# Patient Record
Sex: FEMALE | Race: WHITE | Employment: UNEMPLOYED | URBAN - NONMETROPOLITAN AREA
[De-identification: names, ages, dates, MRNs, and addresses within clinical notes are randomized per-mention and may not be internally consistent; named-entity substitution may affect disease eponyms.]

---

## 2017-04-04 ENCOUNTER — OFFICE VISIT (OUTPATIENT)
Dept: BARIATRICS/WEIGHT MGMT | Age: 47
End: 2017-04-04

## 2017-04-04 VITALS
WEIGHT: 261.6 LBS | HEART RATE: 104 BPM | BODY MASS INDEX: 49.39 KG/M2 | SYSTOLIC BLOOD PRESSURE: 136 MMHG | HEIGHT: 61 IN | DIASTOLIC BLOOD PRESSURE: 84 MMHG | RESPIRATION RATE: 18 BRPM | TEMPERATURE: 98.8 F

## 2017-04-04 DIAGNOSIS — K27.9 PEPTIC ULCER DISEASE: ICD-10-CM

## 2017-04-04 DIAGNOSIS — Z87.442 HISTORY OF KIDNEY STONES: ICD-10-CM

## 2017-04-04 DIAGNOSIS — E66.01 MORBID OBESITY WITH BMI OF 45.0-49.9, ADULT (HCC): Primary | ICD-10-CM

## 2017-04-04 DIAGNOSIS — E11.9 TYPE 2 DIABETES MELLITUS TREATED WITHOUT INSULIN (HCC): ICD-10-CM

## 2017-04-04 DIAGNOSIS — E03.9 HYPOTHYROIDISM, UNSPECIFIED: ICD-10-CM

## 2017-04-04 DIAGNOSIS — M54.41 LOW BACK PAIN WITH RIGHT-SIDED SCIATICA, UNSPECIFIED BACK PAIN LATERALITY, UNSPECIFIED CHRONICITY: ICD-10-CM

## 2017-04-04 PROCEDURE — 99204 OFFICE O/P NEW MOD 45 MIN: CPT | Performed by: SURGERY

## 2017-04-04 ASSESSMENT — ENCOUNTER SYMPTOMS
BACK PAIN: 1
ALLERGIC/IMMUNOLOGIC NEGATIVE: 1
RESPIRATORY NEGATIVE: 1
NAUSEA: 1
EYES NEGATIVE: 1

## 2017-04-21 ENCOUNTER — OFFICE VISIT (OUTPATIENT)
Dept: BARIATRICS/WEIGHT MGMT | Age: 47
End: 2017-04-21

## 2017-04-21 DIAGNOSIS — E66.9 OBESITY, UNSPECIFIED OBESITY SEVERITY, UNSPECIFIED OBESITY TYPE: Primary | ICD-10-CM

## 2019-06-12 ENCOUNTER — HOSPITAL ENCOUNTER (OUTPATIENT)
Age: 49
Setting detail: SPECIMEN
Discharge: HOME | End: 2019-06-12
Payer: MEDICARE

## 2019-06-12 LAB
CHOLESTEROL/HDL RATIO: 3.5
CHOLESTEROL: 184 MG/DL
HDLC SERPL-MCNC: 52 MG/DL
LDL CHOLESTEROL: 117 MG/DL (ref 0–130)
THYROXINE, FREE: 1.43 NG/DL (ref 0.93–1.7)
TRIGL SERPL-MCNC: 75 MG/DL
TSH SERPL DL<=0.05 MIU/L-ACNC: 6.3 MIU/L (ref 0.3–5)
VLDLC SERPL CALC-MCNC: NORMAL MG/DL (ref 1–30)

## 2019-06-13 LAB
ESTIMATED AVERAGE GLUCOSE: 97 MG/DL
HBA1C MFR BLD: 5 % (ref 4–6)

## 2019-10-09 ENCOUNTER — HOSPITAL ENCOUNTER (OUTPATIENT)
Age: 49
Setting detail: SPECIMEN
Discharge: HOME | End: 2019-10-09
Payer: MEDICARE

## 2019-10-09 LAB
ABSOLUTE EOS #: 0.34 K/UL (ref 0–0.44)
ABSOLUTE IMMATURE GRANULOCYTE: 0.05 K/UL (ref 0–0.3)
ABSOLUTE LYMPH #: 1.61 K/UL (ref 1.1–3.7)
ABSOLUTE MONO #: 0.7 K/UL (ref 0.1–1.2)
ALBUMIN SERPL-MCNC: 3.9 G/DL (ref 3.5–5.2)
ALBUMIN/GLOBULIN RATIO: 1.1 (ref 1–2.5)
ALP BLD-CCNC: 63 U/L (ref 35–104)
ALT SERPL-CCNC: 22 U/L (ref 5–33)
ANION GAP SERPL CALCULATED.3IONS-SCNC: 13 MMOL/L (ref 9–17)
AST SERPL-CCNC: 15 U/L
BASOPHILS # BLD: 1 % (ref 0–2)
BASOPHILS ABSOLUTE: 0.06 K/UL (ref 0–0.2)
BILIRUB SERPL-MCNC: 0.17 MG/DL (ref 0.3–1.2)
BUN BLDV-MCNC: 12 MG/DL (ref 6–20)
BUN/CREAT BLD: ABNORMAL (ref 9–20)
CALCIUM SERPL-MCNC: 9.2 MG/DL (ref 8.6–10.4)
CHLORIDE BLD-SCNC: 104 MMOL/L (ref 98–107)
CHOLESTEROL, FASTING: 219 MG/DL
CHOLESTEROL/HDL RATIO: 4.7
CO2: 21 MMOL/L (ref 20–31)
CREAT SERPL-MCNC: 0.52 MG/DL (ref 0.5–0.9)
DIFFERENTIAL TYPE: ABNORMAL
EOSINOPHILS RELATIVE PERCENT: 4 % (ref 1–4)
FERRITIN: 94 UG/L (ref 13–150)
GFR AFRICAN AMERICAN: >60 ML/MIN
GFR NON-AFRICAN AMERICAN: >60 ML/MIN
GFR SERPL CREATININE-BSD FRML MDRD: ABNORMAL ML/MIN/{1.73_M2}
GFR SERPL CREATININE-BSD FRML MDRD: ABNORMAL ML/MIN/{1.73_M2}
GLUCOSE BLD-MCNC: 82 MG/DL (ref 70–99)
HCT VFR BLD CALC: 46.3 % (ref 36.3–47.1)
HDLC SERPL-MCNC: 47 MG/DL
HEMOGLOBIN: 14.4 G/DL (ref 11.9–15.1)
IMMATURE GRANULOCYTES: 1 %
LDL CHOLESTEROL: 141 MG/DL (ref 0–130)
LYMPHOCYTES # BLD: 19 % (ref 24–43)
MCH RBC QN AUTO: 29.2 PG (ref 25.2–33.5)
MCHC RBC AUTO-ENTMCNC: 31.1 G/DL (ref 28.4–34.8)
MCV RBC AUTO: 93.9 FL (ref 82.6–102.9)
MONOCYTES # BLD: 8 % (ref 3–12)
NRBC AUTOMATED: 0 PER 100 WBC
PDW BLD-RTO: 13.8 % (ref 11.8–14.4)
PLATELET # BLD: 379 K/UL (ref 138–453)
PLATELET ESTIMATE: ABNORMAL
PMV BLD AUTO: 9.9 FL (ref 8.1–13.5)
POTASSIUM SERPL-SCNC: 4.1 MMOL/L (ref 3.7–5.3)
RBC # BLD: 4.93 M/UL (ref 3.95–5.11)
RBC # BLD: ABNORMAL 10*6/UL
SEG NEUTROPHILS: 67 % (ref 36–65)
SEGMENTED NEUTROPHILS ABSOLUTE COUNT: 5.87 K/UL (ref 1.5–8.1)
SODIUM BLD-SCNC: 138 MMOL/L (ref 135–144)
TOTAL PROTEIN: 7.3 G/DL (ref 6.4–8.3)
TRIGLYCERIDE, FASTING: 153 MG/DL
TSH SERPL DL<=0.05 MIU/L-ACNC: 3.47 MIU/L (ref 0.3–5)
VITAMIN B-12: 435 PG/ML (ref 232–1245)
VITAMIN D 25-HYDROXY: 22 NG/ML (ref 30–100)
VLDLC SERPL CALC-MCNC: ABNORMAL MG/DL (ref 1–30)
WBC # BLD: 8.6 K/UL (ref 3.5–11.3)
WBC # BLD: ABNORMAL 10*3/UL

## 2019-12-10 ENCOUNTER — HOSPITAL ENCOUNTER (OUTPATIENT)
Age: 49
Setting detail: SPECIMEN
Discharge: HOME | End: 2019-12-10
Payer: MEDICARE

## 2019-12-11 LAB
DIRECT EXAM: NORMAL
Lab: NORMAL
SPECIMEN DESCRIPTION: NORMAL

## 2019-12-12 LAB
CYTOLOGY REPORT: NORMAL
HPV SAMPLE: NORMAL
HPV, GENOTYPE 16: NOT DETECTED
HPV, GENOTYPE 18: NOT DETECTED
HPV, HIGH RISK OTHER: NOT DETECTED
HPV, INTERPRETATION: NORMAL
SPECIMEN DESCRIPTION: NORMAL

## 2020-09-21 ENCOUNTER — HOSPITAL ENCOUNTER (OUTPATIENT)
Age: 50
Setting detail: SPECIMEN
Discharge: HOME | End: 2020-09-21
Payer: MEDICARE

## 2020-09-23 LAB
CULTURE: NORMAL
Lab: NORMAL
SPECIMEN DESCRIPTION: NORMAL

## 2020-10-05 ENCOUNTER — HOSPITAL ENCOUNTER (OUTPATIENT)
Age: 50
Setting detail: SPECIMEN
Discharge: HOME | End: 2020-10-05
Payer: MEDICARE

## 2020-10-05 LAB
ABSOLUTE EOS #: 0.31 K/UL (ref 0–0.44)
ABSOLUTE IMMATURE GRANULOCYTE: 0.04 K/UL (ref 0–0.3)
ABSOLUTE LYMPH #: 2 K/UL (ref 1.1–3.7)
ABSOLUTE MONO #: 0.74 K/UL (ref 0.1–1.2)
ALBUMIN SERPL-MCNC: 4 G/DL (ref 3.5–5.2)
ALBUMIN/GLOBULIN RATIO: 1.3 (ref 1–2.5)
ALP BLD-CCNC: 75 U/L (ref 35–104)
ALT SERPL-CCNC: 15 U/L (ref 5–33)
ANION GAP SERPL CALCULATED.3IONS-SCNC: 13 MMOL/L (ref 9–17)
AST SERPL-CCNC: 13 U/L
BASOPHILS # BLD: 1 % (ref 0–2)
BASOPHILS ABSOLUTE: 0.09 K/UL (ref 0–0.2)
BILIRUB SERPL-MCNC: 0.39 MG/DL (ref 0.3–1.2)
BUN BLDV-MCNC: 13 MG/DL (ref 6–20)
BUN/CREAT BLD: NORMAL (ref 9–20)
C-REACTIVE PROTEIN: 27.2 MG/L (ref 0–5)
CALCIUM SERPL-MCNC: 9.5 MG/DL (ref 8.6–10.4)
CHLORIDE BLD-SCNC: 101 MMOL/L (ref 98–107)
CHOLESTEROL, FASTING: 199 MG/DL
CHOLESTEROL/HDL RATIO: 4.5
CO2: 26 MMOL/L (ref 20–31)
CREAT SERPL-MCNC: 0.72 MG/DL (ref 0.5–0.9)
DIFFERENTIAL TYPE: ABNORMAL
EOSINOPHILS RELATIVE PERCENT: 3 % (ref 1–4)
ESTIMATED AVERAGE GLUCOSE: 114 MG/DL
FERRITIN: 138 UG/L (ref 13–150)
GFR AFRICAN AMERICAN: >60 ML/MIN
GFR NON-AFRICAN AMERICAN: >60 ML/MIN
GFR SERPL CREATININE-BSD FRML MDRD: NORMAL ML/MIN/{1.73_M2}
GFR SERPL CREATININE-BSD FRML MDRD: NORMAL ML/MIN/{1.73_M2}
GLUCOSE BLD-MCNC: 90 MG/DL (ref 70–99)
HBA1C MFR BLD: 5.6 % (ref 4–6)
HCT VFR BLD CALC: 46.4 % (ref 36.3–47.1)
HDLC SERPL-MCNC: 44 MG/DL
HEMOGLOBIN: 14.6 G/DL (ref 11.9–15.1)
IMMATURE GRANULOCYTES: 0 %
IRON SATURATION: 30 % (ref 20–55)
IRON: 79 UG/DL (ref 37–145)
LDL CHOLESTEROL: 135 MG/DL (ref 0–130)
LYMPHOCYTES # BLD: 21 % (ref 24–43)
MCH RBC QN AUTO: 28.7 PG (ref 25.2–33.5)
MCHC RBC AUTO-ENTMCNC: 31.5 G/DL (ref 28.4–34.8)
MCV RBC AUTO: 91.3 FL (ref 82.6–102.9)
MONOCYTES # BLD: 8 % (ref 3–12)
NRBC AUTOMATED: 0 PER 100 WBC
PDW BLD-RTO: 14.4 % (ref 11.8–14.4)
PLATELET # BLD: 366 K/UL (ref 138–453)
PLATELET ESTIMATE: ABNORMAL
PMV BLD AUTO: 9.8 FL (ref 8.1–13.5)
POTASSIUM SERPL-SCNC: 4.3 MMOL/L (ref 3.7–5.3)
RBC # BLD: 5.08 M/UL (ref 3.95–5.11)
RBC # BLD: ABNORMAL 10*6/UL
RHEUMATOID FACTOR: <10 IU/ML
SEDIMENTATION RATE, ERYTHROCYTE: 17 MM (ref 0–30)
SEG NEUTROPHILS: 67 % (ref 36–65)
SEGMENTED NEUTROPHILS ABSOLUTE COUNT: 6.18 K/UL (ref 1.5–8.1)
SODIUM BLD-SCNC: 140 MMOL/L (ref 135–144)
TOTAL IRON BINDING CAPACITY: 261 UG/DL (ref 250–450)
TOTAL PROTEIN: 7.1 G/DL (ref 6.4–8.3)
TRIGLYCERIDE, FASTING: 101 MG/DL
TSH SERPL DL<=0.05 MIU/L-ACNC: 3.92 MIU/L (ref 0.3–5)
UNSATURATED IRON BINDING CAPACITY: 182 UG/DL (ref 112–347)
VLDLC SERPL CALC-MCNC: ABNORMAL MG/DL (ref 1–30)
WBC # BLD: 9.4 K/UL (ref 3.5–11.3)
WBC # BLD: ABNORMAL 10*3/UL

## 2020-10-06 LAB — ANTI-NUCLEAR ANTIBODY (ANA): NEGATIVE

## 2020-12-07 LAB — MAMMOGRAPHY, EXTERNAL: NORMAL

## 2021-02-23 ENCOUNTER — HOSPITAL ENCOUNTER (OUTPATIENT)
Age: 51
Setting detail: SPECIMEN
Discharge: HOME | End: 2021-02-23
Payer: MEDICARE

## 2021-02-23 PROCEDURE — U0003 INFECTIOUS AGENT DETECTION BY NUCLEIC ACID (DNA OR RNA); SEVERE ACUTE RESPIRATORY SYNDROME CORONAVIRUS 2 (SARS-COV-2) (CORONAVIRUS DISEASE [COVID-19]), AMPLIFIED PROBE TECHNIQUE, MAKING USE OF HIGH THROUGHPUT TECHNOLOGIES AS DESCRIBED BY CMS-2020-01-R: HCPCS

## 2021-02-25 LAB — SARS-COV-2: NOT DETECTED

## 2021-03-02 ENCOUNTER — ANESTHESIA (OUTPATIENT)
Dept: ENDOSCOPY | Age: 51
End: 2021-03-02
Payer: MEDICARE

## 2021-03-02 ENCOUNTER — HOSPITAL ENCOUNTER (OUTPATIENT)
Age: 51
Setting detail: OUTPATIENT SURGERY
Discharge: HOME | End: 2021-03-02
Attending: INTERNAL MEDICINE | Admitting: INTERNAL MEDICINE
Payer: MEDICARE

## 2021-03-02 ENCOUNTER — ANESTHESIA EVENT (OUTPATIENT)
Dept: ENDOSCOPY | Age: 51
End: 2021-03-02
Payer: MEDICARE

## 2021-03-02 VITALS
HEART RATE: 78 BPM | TEMPERATURE: 97.6 F | SYSTOLIC BLOOD PRESSURE: 103 MMHG | RESPIRATION RATE: 16 BRPM | OXYGEN SATURATION: 96 % | WEIGHT: 285.8 LBS | BODY MASS INDEX: 53.96 KG/M2 | HEIGHT: 61 IN | DIASTOLIC BLOOD PRESSURE: 54 MMHG

## 2021-03-02 VITALS
SYSTOLIC BLOOD PRESSURE: 132 MMHG | RESPIRATION RATE: 11 BRPM | OXYGEN SATURATION: 100 % | DIASTOLIC BLOOD PRESSURE: 58 MMHG

## 2021-03-02 PROCEDURE — 2500000003 HC RX 250 WO HCPCS: Performed by: NURSE ANESTHETIST, CERTIFIED REGISTERED

## 2021-03-02 PROCEDURE — 3609012400 HC EGD TRANSORAL BIOPSY SINGLE/MULTIPLE: Performed by: INTERNAL MEDICINE

## 2021-03-02 PROCEDURE — 3700000000 HC ANESTHESIA ATTENDED CARE: Performed by: INTERNAL MEDICINE

## 2021-03-02 PROCEDURE — 6360000002 HC RX W HCPCS: Performed by: NURSE ANESTHETIST, CERTIFIED REGISTERED

## 2021-03-02 PROCEDURE — 2580000003 HC RX 258: Performed by: INTERNAL MEDICINE

## 2021-03-02 PROCEDURE — 2709999900 HC NON-CHARGEABLE SUPPLY: Performed by: INTERNAL MEDICINE

## 2021-03-02 PROCEDURE — 7100000010 HC PHASE II RECOVERY - FIRST 15 MIN: Performed by: INTERNAL MEDICINE

## 2021-03-02 PROCEDURE — 88305 TISSUE EXAM BY PATHOLOGIST: CPT

## 2021-03-02 PROCEDURE — 7100000011 HC PHASE II RECOVERY - ADDTL 15 MIN: Performed by: INTERNAL MEDICINE

## 2021-03-02 RX ORDER — SODIUM CHLORIDE 450 MG/100ML
INJECTION, SOLUTION INTRAVENOUS CONTINUOUS
Status: DISCONTINUED | OUTPATIENT
Start: 2021-03-02 | End: 2021-03-02 | Stop reason: HOSPADM

## 2021-03-02 RX ORDER — LIDOCAINE HYDROCHLORIDE 20 MG/ML
INJECTION, SOLUTION INFILTRATION; PERINEURAL PRN
Status: DISCONTINUED | OUTPATIENT
Start: 2021-03-02 | End: 2021-03-02 | Stop reason: SDUPTHER

## 2021-03-02 RX ORDER — TRAZODONE HYDROCHLORIDE 50 MG/1
50 TABLET ORAL NIGHTLY
COMMUNITY

## 2021-03-02 RX ORDER — SERTRALINE HYDROCHLORIDE 25 MG/1
50 TABLET, FILM COATED ORAL DAILY
COMMUNITY

## 2021-03-02 RX ORDER — CYCLOBENZAPRINE HCL 10 MG
10 TABLET ORAL 3 TIMES DAILY PRN
COMMUNITY
End: 2022-07-18

## 2021-03-02 RX ORDER — PROPOFOL 10 MG/ML
INJECTION, EMULSION INTRAVENOUS PRN
Status: DISCONTINUED | OUTPATIENT
Start: 2021-03-02 | End: 2021-03-02 | Stop reason: SDUPTHER

## 2021-03-02 RX ADMIN — LIDOCAINE HYDROCHLORIDE 60 MG: 20 INJECTION, SOLUTION INFILTRATION; PERINEURAL at 15:35

## 2021-03-02 RX ADMIN — LIDOCAINE HYDROCHLORIDE 100 MG: 20 INJECTION, SOLUTION INFILTRATION; PERINEURAL at 15:33

## 2021-03-02 RX ADMIN — PROPOFOL 90 MG: 10 INJECTION, EMULSION INTRAVENOUS at 15:33

## 2021-03-02 RX ADMIN — SODIUM CHLORIDE: 4.5 INJECTION, SOLUTION INTRAVENOUS at 14:32

## 2021-03-02 ASSESSMENT — PAIN SCALES - GENERAL: PAINLEVEL_OUTOF10: 0

## 2021-03-02 NOTE — PROGRESS NOTES
Burrell Homans is phase 2 . Dr Susy Yuen spoke to patinet and daughter and findings .  Patient is drinking cpop without  probems

## 2021-03-02 NOTE — ANESTHESIA PRE PROCEDURE
Department of Anesthesiology  Preprocedure Note       Name:  Naa Ortez   Age:  45 y.o.  :  1970                                          MRN:  [id number]         Date:  3/2/2021      Surgeon: Sara Flores):  Adriana Mohs, MD    Procedure: Procedure(s):  EGD ESOPHAGOGASTRODUODENOSCOPY    Medications prior to admission:   Prior to Admission medications    Medication Sig Start Date End Date Taking? Authorizing Provider   hydrochlorothiazide (HYDRODIURIL) 25 MG tablet Take 12.5 mg by mouth daily     Historical Provider, MD   amLODIPine (NORVASC) 10 MG tablet Take 10 mg by mouth daily. Historical Provider, MD   metoprolol (TOPROL-XL) 25 MG XL tablet Take 25 mg by mouth 2 times daily. Historical Provider, MD   levothyroxine (SYNTHROID) 100 MCG tablet Take 100 mcg by mouth daily. Historical Provider, MD   metformin (GLUCOPHAGE) 500 MG tablet Take 500 mg by mouth 2 times daily (with meals). Historical Provider, MD   ferrous sulfate 325 (65 FE) MG tablet Take 1 tablet by mouth 3 times daily (with meals) for 30 days. 9/13/12 10/13/12  Carlene Zacarias MD   omeprazole (PRILOSEC) 20 MG capsule Take 1 capsule by mouth 2 times daily (before meals). 12   Carlene Zacarias MD       Current medications:    No current facility-administered medications for this encounter. Allergies:  No Known Allergies    Problem List:  There is no problem list on file for this patient.       Past Medical History:        Diagnosis Date    Anemia     Chronic back pain     Diabetes mellitus (HCC)     GERD (gastroesophageal reflux disease)     Hypertension     Hypothyroidism     Kidney stones     Thyroid disease     Ulcer ([facility] [address]        Past Surgical History:        Procedure Laterality Date    TUBAL LIGATION         Social History:    Social History     Tobacco Use    Smoking status: Former Smoker     Quit date:      Years since quittin.1    Smokeless tobacco: Never Used   Substance Use Topics    Alcohol use: Yes     Comment: occasional                                 Counseling given: Not Answered      Vital Signs (Current): There were no vitals filed for this visit. BP Readings from Last 3 Encounters:   04/04/17 136/84   09/13/12 120/83       NPO Status:                                                                                 BMI:   Wt Readings from Last 3 Encounters:   04/04/17 261 lb 9.6 oz (118.7 kg)   09/13/12 257 lb (116.6 kg)     There is no height or weight on file to calculate BMI.    CBC:   Lab Results   Component Value Date    WBC 9.4 10/05/2020    RBC 5.08 10/05/2020    HGB 14.6 10/05/2020    HCT 46.4 10/05/2020    MCV 91.3 10/05/2020    RDW 14.4 10/05/2020     10/05/2020       CMP:   Lab Results   Component Value Date     10/05/2020    K 4.3 10/05/2020     10/05/2020    CO2 26 10/05/2020    BUN 13 10/05/2020    CREATININE 0.72 10/05/2020    GFRAA >60 10/05/2020    LABGLOM >60 10/05/2020    GLUCOSE 90 10/05/2020    PROT 7.1 10/05/2020    CALCIUM 9.5 10/05/2020    BILITOT 0.39 10/05/2020    ALKPHOS 75 10/05/2020    AST 13 10/05/2020    ALT 15 10/05/2020       POC Tests: No results for input(s): POCGLU, POCNA, POCK, POCCL, POCBUN, POCHEMO, POCHCT in the last 72 hours.     Coags: No results found for: PROTIME, INR, APTT    HCG (If Applicable): No results found for: PREGTESTUR, PREGSERUM, HCG, HCGQUANT     ABGs: No results found for: PHART, PO2ART, RSM4UVW, NLM7SZW, BEART, R0MCQOWU     Type & Screen (If Applicable):  No results found for: LABABO, LABRH    Drug/Infectious Status (If Applicable):  No results found for: HIV, HEPCAB    COVID-19 Screening (If Applicable):   Lab Results   Component Value Date    COVID19 Not Detected 02/23/2021         Anesthesia Evaluation  Patient summary reviewed no history of anesthetic complications:   Airway: Mallampati: II  TM distance: >3 FB   Neck ROM: full  Mouth opening: > = 3 FB Dental: normal exam         Pulmonary:Negative Pulmonary ROS breath sounds clear to auscultation                             Cardiovascular:  Exercise tolerance: poor (<4 METS),   (+) hypertension: no interval change,         Rhythm: regular  Rate: normal                    Neuro/Psych:               GI/Hepatic/Renal:   (+) GERD: poorly controlled,           Endo/Other:    (+) DiabetesType II DM, well controlled, , .          Pt had no PAT visit       Abdominal:   (+) obese,         Vascular: negative vascular ROS. Anesthesia Plan      MAC     ASA 3       Induction: intravenous. Anesthetic plan and risks discussed with patient. Plan discussed with CRNA.                   MISTY Schaeffer - JANIYA   3/2/2021

## 2021-03-02 NOTE — H&P
[facility]  Sedation/Analgesia History & Physical    Patient: Amrik Westfall : 1970  Med Rec#: [id number] Acc#: [id number]   Provider Performing Procedure: Cely Cristobal  Primary Care Physician: LESLY Barth    PRE-PROCEDURE   Full CODE [x]Yes  DNR-CCA/DNR-CC []Yes   Brief History/Pre-Procedure Diagnosisgerd          MEDICAL HISTORY  []CAD/Valve  []Liver Disease  []Lung Disease []Diabetes  []Hypertension []Renal Disease  []Additional information:       has a past medical history of Anemia, Arthritis, Chronic back pain, Diabetes mellitus ([facility] [address] GERD (gastroesophageal reflux disease), Hypertension, Hypothyroidism, Kidney stones, Thyroid disease, and Ulcer. SURGICAL HISTORY   has a past surgical history that includes Tubal ligation; Cholecystectomy; Colonoscopy; and Endoscopy, colon, diagnostic. Additional information:       ALLERGIES   Allergies as of 2021    (No Known Allergies)     Additional information:       MEDICATIONS   Coumadin Use Last 7 Days [x]No []Yes  Antiplatelet drug therapy use last 7 days  [x]No []Yes  Other anticoagulant use last 7 days  [x]No []Yes    Current Facility-Administered Medications:     0.45 % sodium chloride infusion, , Intravenous, Continuous, Cely Cristobal MD, Last Rate: 75 mL/hr at 21 1432, New Bag at 21 1432  Prior to Admission medications    Medication Sig Start Date End Date Taking? Authorizing Provider   traZODone (DESYREL) 50 MG tablet Take 50 mg by mouth nightly   Yes Historical Provider, MD   sertraline (ZOLOFT) 25 MG tablet Take 25 mg by mouth daily   Yes Historical Provider, MD   cyclobenzaprine (FLEXERIL) 10 MG tablet Take 10 mg by mouth 3 times daily as needed for Muscle spasms   Yes Historical Provider, MD   hydrochlorothiazide (HYDRODIURIL) 25 MG tablet Take 12.5 mg by mouth daily    Yes Historical Provider, MD   amLODIPine (NORVASC) 10 MG tablet Take 10 mg by mouth daily.      Yes Historical Provider, MD metoprolol (TOPROL-XL) 25 MG XL tablet Take 25 mg by mouth 2 times daily. Yes Historical Provider, MD   levothyroxine (SYNTHROID) 100 MCG tablet Take 100 mcg by mouth daily. Yes Historical Provider, MD   ferrous sulfate 325 (65 FE) MG tablet Take 1 tablet by mouth 3 times daily (with meals) for 30 days. 9/13/12 3/2/21 Yes Crissy Vitale MD   omeprazole (PRILOSEC) 20 MG capsule Take 1 capsule by mouth 2 times daily (before meals). 9/13/12  Yes Crissy Vitale MD     Additional information:       PHYSICAL:   Heart:  [x]Regular rate and rhythm  []Other:    Lungs:  [x]Clear    []Other:    Abdomen: [x]Soft    []Other:    Mental Status: [x]Alert & Oriented  []Other:      VITAL SIGNS   Patient Vitals for the past 24 hrs:   BP Temp Temp src Pulse Resp SpO2 Height Weight   03/02/21 1423 115/63 98.3 °F (36.8 °C) Temporal 83 18 96 % 5' 1\" (1.549 m) 285 lb 12.8 oz (129.6 kg)       PLANNED PROCEDURE  [x]EGD  []Colonoscopy []Flex Sigmoid  []ERCP []EUS   []Cystoscopy  [] CATH [] BRONCH   Consent: I have discussed with the patient and/or the patient representative the indication, alternatives, and the possible risks and/or complications of the planned procedure and the anesthesia methods. The patient and/or patient representative appear to understand and agree to proceed. SEDATION  Planned agent:[]Midazolam []Meperidine []Sublimaze []Morphine  []Diazepam [x]Propofol  []Other:     ASA Classification: Class 3 - A patient with severe systemic disease that limits activity but is not incapacitating    Airway Assessment: normal    Monitoring and Safety: The patient will be placed on a cardiac monitor and vital signs, pulse oximetry and level of consciousness will be continuously evaluated throughout the procedure. The patient will be closely monitored until recovery from the medications is complete and the patient has returned to baseline status.   Respiratory therapy will be on standby during the procedure. [x]Pre-procedure diagnostic studies complete and results available. Comment:    [x]Previous sedation/anesthesia experiences assessed. Comment:    [x]The patient is an appropriate candidate to undergo the planned procedure sedation and anesthesia. (Refer to nursing sedation/analgesia documentation record)  [x]Formulation and discussion of sedation/procedure plan, risks, and expectations with patient and/or responsible adult completed. [x]Patient examined immediately prior to the procedure.  (Refer to nursing sedation/analgesia documentation record)    Liliana Manuel MD   Electronically signed 3/2/2021 at 2:38 PM

## 2021-03-02 NOTE — ANESTHESIA POSTPROCEDURE EVALUATION
Department of Anesthesiology  Postprocedure Note    Patient: Alan Ho  MRN: [id number]  YOB: 1970  Date of evaluation: 3/2/2021  Time:  4:15 PM     Procedure Summary     Date: 03/02/21 Room / Location: [address] [place] [address] / [address] [place] [address]    Anesthesia Start: 8638 Anesthesia Stop: 0817    Procedure: EGD BIOPSY (N/A ) Diagnosis: (ESOPHAGEAL REFLUX, BARRETTS)    Surgeons: Eben Espinoza MD Responsible Provider: Kory Crespo DO    Anesthesia Type: MAC ASA Status: 3          Anesthesia Type: MAC    Rogerio Phase I: Rogerio Score: 10    Rogerio Phase II: Rogerio Score: 10    Last vitals: Reviewed and per EMR flowsheets.        Anesthesia Post Evaluation    Patient location during evaluation: bedside  Patient participation: complete - patient cannot participate  Level of consciousness: awake and alert  Pain score: 0  Airway patency: patent  Nausea & Vomiting: no nausea and no vomiting  Complications: no  Cardiovascular status: hemodynamically stable  Respiratory status: acceptable, spontaneous ventilation and room air  Hydration status: euvolemic

## 2021-03-03 NOTE — OP NOTE
for further titration of medication. EGD in three  years. ESTIMATED BLOOD LOSS:  None.         Charles Bates M.D.    D: 03/02/2021 15:50:37       T: 03/03/2021 1:19:26     [email or web address]  Job#: [id number]     Doc#: [id number]    CC:  Caty Henderson

## 2021-12-06 ENCOUNTER — HOSPITAL ENCOUNTER (OUTPATIENT)
Age: 51
Setting detail: SPECIMEN
Discharge: HOME | End: 2021-12-06

## 2021-12-06 LAB
ABSOLUTE EOS #: 0.33 K/UL (ref 0–0.44)
ABSOLUTE IMMATURE GRANULOCYTE: 0.07 K/UL (ref 0–0.3)
ABSOLUTE LYMPH #: 1.8 K/UL (ref 1.1–3.7)
ABSOLUTE MONO #: 0.94 K/UL (ref 0.1–1.2)
ALBUMIN SERPL-MCNC: 4 G/DL (ref 3.5–5.2)
ALBUMIN/GLOBULIN RATIO: 1.6 (ref 1–2.5)
ALP BLD-CCNC: 87 U/L (ref 35–104)
ALT SERPL-CCNC: 16 U/L (ref 5–33)
ANION GAP SERPL CALCULATED.3IONS-SCNC: 9 MMOL/L (ref 9–17)
AST SERPL-CCNC: 12 U/L
BASOPHILS # BLD: 1 % (ref 0–2)
BASOPHILS ABSOLUTE: 0.06 K/UL (ref 0–0.2)
BILIRUB SERPL-MCNC: 0.17 MG/DL (ref 0.3–1.2)
BUN BLDV-MCNC: 18 MG/DL (ref 6–20)
BUN/CREAT BLD: ABNORMAL (ref 9–20)
C-REACTIVE PROTEIN: 37 MG/L (ref 0–5)
CALCIUM SERPL-MCNC: 9.2 MG/DL (ref 8.6–10.4)
CHLORIDE BLD-SCNC: 101 MMOL/L (ref 98–107)
CHOLESTEROL, FASTING: 218 MG/DL
CHOLESTEROL/HDL RATIO: 5
CO2: 26 MMOL/L (ref 20–31)
CREAT SERPL-MCNC: 0.82 MG/DL (ref 0.5–0.9)
DIFFERENTIAL TYPE: ABNORMAL
EOSINOPHILS RELATIVE PERCENT: 3 % (ref 1–4)
ESTRADIOL LEVEL: 106 PG/ML (ref 27–314)
FOLLICLE STIMULATING HORMONE: 2.9 U/L (ref 1.7–21.5)
GFR AFRICAN AMERICAN: >60 ML/MIN
GFR NON-AFRICAN AMERICAN: >60 ML/MIN
GFR SERPL CREATININE-BSD FRML MDRD: ABNORMAL ML/MIN/{1.73_M2}
GFR SERPL CREATININE-BSD FRML MDRD: ABNORMAL ML/MIN/{1.73_M2}
GLUCOSE BLD-MCNC: 91 MG/DL (ref 70–99)
HCT VFR BLD CALC: 43.7 % (ref 36.3–47.1)
HDLC SERPL-MCNC: 44 MG/DL
HEMOGLOBIN: 14 G/DL (ref 11.9–15.1)
IMMATURE GRANULOCYTES: 1 %
LDL CHOLESTEROL: 154 MG/DL (ref 0–130)
LH: 4.6 U/L (ref 1–95.6)
LYMPHOCYTES # BLD: 18 % (ref 24–43)
MCH RBC QN AUTO: 29.7 PG (ref 25.2–33.5)
MCHC RBC AUTO-ENTMCNC: 32 G/DL (ref 28.4–34.8)
MCV RBC AUTO: 92.8 FL (ref 82.6–102.9)
MONOCYTES # BLD: 9 % (ref 3–12)
NRBC AUTOMATED: 0 PER 100 WBC
PDW BLD-RTO: 14.3 % (ref 11.8–14.4)
PLATELET # BLD: 315 K/UL (ref 138–453)
PLATELET ESTIMATE: ABNORMAL
PMV BLD AUTO: 10.2 FL (ref 8.1–13.5)
POTASSIUM SERPL-SCNC: 4.8 MMOL/L (ref 3.7–5.3)
RBC # BLD: 4.71 M/UL (ref 3.95–5.11)
RBC # BLD: ABNORMAL 10*6/UL
RHEUMATOID FACTOR: <10 IU/ML
SEDIMENTATION RATE, ERYTHROCYTE: 28 MM (ref 0–30)
SEG NEUTROPHILS: 68 % (ref 36–65)
SEGMENTED NEUTROPHILS ABSOLUTE COUNT: 7.01 K/UL (ref 1.5–8.1)
SODIUM BLD-SCNC: 136 MMOL/L (ref 135–144)
TOTAL PROTEIN: 6.5 G/DL (ref 6.4–8.3)
TRIGLYCERIDE, FASTING: 99 MG/DL
TSH SERPL DL<=0.05 MIU/L-ACNC: 3.09 MIU/L (ref 0.3–5)
URIC ACID: 4.8 MG/DL (ref 2.4–5.7)
VITAMIN D 25-HYDROXY: 28.6 NG/ML (ref 30–100)
VLDLC SERPL CALC-MCNC: ABNORMAL MG/DL (ref 1–30)
WBC # BLD: 10.2 K/UL (ref 3.5–11.3)
WBC # BLD: ABNORMAL 10*3/UL

## 2021-12-07 LAB
ANTI DNA DOUBLE STRANDED: <0.5 IU/ML
ANTI-NUCLEAR ANTIBODY (ANA): POSITIVE
CCP IGG ANTIBODIES: 4.4 U/ML (ref 0–7)
ENA ANTIBODIES SCREEN: 4.6 U/ML

## 2021-12-08 LAB
ANTI JO-1 IGG: <0.4 U/ML
ANTI SSA: 56 U/ML
ANTI SSB: <0.3 U/ML
ANTI-CENTROMERE: <0.4 U/ML
ANTI-RNP70: <0.3 U/ML
ANTI-SCLERODERMA: <0.6 U/ML
ANTI-SMITH: 1.4 U/ML
ANTI-U1RNP: 1.7 U/ML

## 2022-07-18 ENCOUNTER — OFFICE VISIT (OUTPATIENT)
Dept: RHEUMATOLOGY | Age: 52
End: 2022-07-18
Payer: MEDICARE

## 2022-07-18 ENCOUNTER — NURSE ONLY (OUTPATIENT)
Dept: LAB | Age: 52
End: 2022-07-18

## 2022-07-18 VITALS
BODY MASS INDEX: 55.32 KG/M2 | OXYGEN SATURATION: 95 % | HEART RATE: 73 BPM | WEIGHT: 293 LBS | SYSTOLIC BLOOD PRESSURE: 116 MMHG | HEIGHT: 61 IN | DIASTOLIC BLOOD PRESSURE: 74 MMHG

## 2022-07-18 DIAGNOSIS — M25.561 CHRONIC PAIN OF BOTH KNEES: ICD-10-CM

## 2022-07-18 DIAGNOSIS — M25.562 CHRONIC PAIN OF BOTH KNEES: ICD-10-CM

## 2022-07-18 DIAGNOSIS — Z86.69 H/O IRITIS: ICD-10-CM

## 2022-07-18 DIAGNOSIS — G89.29 CHRONIC PAIN OF BOTH KNEES: ICD-10-CM

## 2022-07-18 DIAGNOSIS — M46.1 SACROILIITIS (HCC): ICD-10-CM

## 2022-07-18 DIAGNOSIS — R76.8 POSITIVE ANA (ANTINUCLEAR ANTIBODY): Primary | ICD-10-CM

## 2022-07-18 DIAGNOSIS — R76.8 POSITIVE ANA (ANTINUCLEAR ANTIBODY): ICD-10-CM

## 2022-07-18 LAB
ALBUMIN SERPL-MCNC: 4.4 G/DL (ref 3.5–5.1)
ALP BLD-CCNC: 86 U/L (ref 38–126)
ALT SERPL-CCNC: 28 U/L (ref 11–66)
ANION GAP SERPL CALCULATED.3IONS-SCNC: 14 MEQ/L (ref 8–16)
AST SERPL-CCNC: 19 U/L (ref 5–40)
BASOPHILS # BLD: 1.3 %
BASOPHILS ABSOLUTE: 0.1 THOU/MM3 (ref 0–0.1)
BILIRUB SERPL-MCNC: 0.2 MG/DL (ref 0.3–1.2)
BUN BLDV-MCNC: 15 MG/DL (ref 7–22)
C-REACTIVE PROTEIN: 2.01 MG/DL (ref 0–1)
CALCIUM SERPL-MCNC: 10 MG/DL (ref 8.5–10.5)
CHLORIDE BLD-SCNC: 102 MEQ/L (ref 98–111)
CO2: 25 MEQ/L (ref 23–33)
CREAT SERPL-MCNC: 0.7 MG/DL (ref 0.4–1.2)
EOSINOPHIL # BLD: 3.3 %
EOSINOPHILS ABSOLUTE: 0.3 THOU/MM3 (ref 0–0.4)
ERYTHROCYTE [DISTWIDTH] IN BLOOD BY AUTOMATED COUNT: 14.5 % (ref 11.5–14.5)
ERYTHROCYTE [DISTWIDTH] IN BLOOD BY AUTOMATED COUNT: 49.6 FL (ref 35–45)
GFR SERPL CREATININE-BSD FRML MDRD: 88 ML/MIN/1.73M2
GLUCOSE BLD-MCNC: 104 MG/DL (ref 70–108)
HCT VFR BLD CALC: 46.8 % (ref 37–47)
HEMOGLOBIN: 14.7 GM/DL (ref 12–16)
IMMATURE GRANS (ABS): 0.08 THOU/MM3 (ref 0–0.07)
IMMATURE GRANULOCYTES: 0.9 %
LYMPHOCYTES # BLD: 21.9 %
LYMPHOCYTES ABSOLUTE: 1.9 THOU/MM3 (ref 1–4.8)
MCH RBC QN AUTO: 29.4 PG (ref 26–33)
MCHC RBC AUTO-ENTMCNC: 31.4 GM/DL (ref 32.2–35.5)
MCV RBC AUTO: 93.6 FL (ref 81–99)
MONOCYTES # BLD: 7.9 %
MONOCYTES ABSOLUTE: 0.7 THOU/MM3 (ref 0.4–1.3)
NUCLEATED RED BLOOD CELLS: 0 /100 WBC
PLATELET # BLD: 361 THOU/MM3 (ref 130–400)
PMV BLD AUTO: 9.8 FL (ref 9.4–12.4)
POTASSIUM SERPL-SCNC: 5.1 MEQ/L (ref 3.5–5.2)
RBC # BLD: 5 MILL/MM3 (ref 4.2–5.4)
SEDIMENTATION RATE, ERYTHROCYTE: 44 MM/HR (ref 0–20)
SEG NEUTROPHILS: 64.7 %
SEGMENTED NEUTROPHILS ABSOLUTE COUNT: 5.6 THOU/MM3 (ref 1.8–7.7)
SODIUM BLD-SCNC: 141 MEQ/L (ref 135–145)
TOTAL PROTEIN: 7.2 G/DL (ref 6.1–8)
WBC # BLD: 8.7 THOU/MM3 (ref 4.8–10.8)

## 2022-07-18 PROCEDURE — 99204 OFFICE O/P NEW MOD 45 MIN: CPT | Performed by: INTERNAL MEDICINE

## 2022-07-18 RX ORDER — MULTIVIT-MIN/IRON/FOLIC ACID/K 18-600-40
2000 CAPSULE ORAL DAILY
COMMUNITY

## 2022-07-18 RX ORDER — OMEPRAZOLE 20 MG/1
40 CAPSULE, DELAYED RELEASE ORAL 2 TIMES DAILY
COMMUNITY

## 2022-07-18 RX ORDER — LOSARTAN POTASSIUM 100 MG/1
100 TABLET ORAL DAILY
COMMUNITY

## 2022-07-18 ASSESSMENT — ENCOUNTER SYMPTOMS
VOMITING: 0
NAUSEA: 0
WHEEZING: 0
DIARRHEA: 0
BLOOD IN STOOL: 0
SHORTNESS OF BREATH: 0
COUGH: 0
ABDOMINAL PAIN: 0

## 2022-07-18 NOTE — PROGRESS NOTES
[facility]) Physicians Rheumatology  Rheumatology Consult Note      7/18/2022       Reason for Consult:  positive BRO  Requesting Physician:  Chente Zurita NP     CHIEF COMPLAINT:    Chief Complaint   Patient presents with    New Patient     New patient, ref by Olivia, positive BRO and elevated CRP           History Obtained From:  patient      HISTORY OF PRESENT ILLNESS:    45 y.o. female presents today for evaluation of positive BRO. This test was prompted by complaints of joint pain. Pain is mostly in her knees (right worse than right), ankles and rigth flank/back and lower back. Pain has been ongoing for years. Reports being diagnosed with osteoarthritis in her knees for 3-4 years. This pain is slightly improving over past few months. Walking aggravates the pain when doing it, but reports improvement in her pain 2 days after walking. Topical Voltaren gel eases the pain. Naproxen eases the pain. Swelling improved with Votlaren. Episodic sharp nonradiating. Rates the pain at 4/10 in pain scale. Has prolonged morning stiffness that lasts for hours in her knees and lower back. No skin rash. Has photosensitivity. No dry eyes or dry mouth. Episodes of oral ulcers. No h/o IBD-like symptoms  No h/o psoriasis or family h/o psoriasis    H/o iritis in 2017 treated with topical steroid eye drops. Past Medical History:     has a past medical history of Anemia, Arthritis, Chronic back pain, Diabetes mellitus ([address] GERD (gastroesophageal reflux disease), Hypertension, Hypothyroidism, Kidney stones, Thyroid disease, and Ulcer. Past Surgical History:     has a past surgical history that includes Tubal ligation; Cholecystectomy; Colonoscopy; Endoscopy, colon, diagnostic; and Upper gastrointestinal endoscopy (N/A, 3/2/2021).     Current Medications:      Current Outpatient Medications:     traZODone (DESYREL) 50 MG tablet, Take 50 mg by mouth nightly, Disp: , Rfl:     sertraline (ZOLOFT) 25 MG tablet, Take 25 mg by mouth daily, Disp: , Rfl:     hydrochlorothiazide (HYDRODIURIL) 25 MG tablet, Take 12.5 mg by mouth daily , Disp: , Rfl:     metoprolol (TOPROL-XL) 25 MG XL tablet, Take 25 mg by mouth 2 times daily. , Disp: , Rfl:     levothyroxine (SYNTHROID) 100 MCG tablet, Take 100 mcg by mouth daily. , Disp: , Rfl:     omeprazole (PRILOSEC) 20 MG capsule, Take 1 capsule by mouth 2 times daily (before meals). , Disp: 30 capsule, Rfl:     cyclobenzaprine (FLEXERIL) 10 MG tablet, Take 10 mg by mouth 3 times daily as needed for Muscle spasms, Disp: , Rfl:     amLODIPine (NORVASC) 10 MG tablet, Take 10 mg by mouth daily. , Disp: , Rfl:     ferrous sulfate 325 (65 FE) MG tablet, Take 1 tablet by mouth 3 times daily (with meals) for 30 days. , Disp: 90 tablet, Rfl: 5    Allergies:    Patient has no known allergies. Social History:     reports that she quit smoking about 15 years ago. Her smoking use included cigarettes. She has never used smokeless tobacco. She reports current alcohol use. She reports that she does not use drugs. Family History:   family history includes Arthritis in her maternal grandfather, maternal grandmother, and mother; Cancer in her paternal grandmother; Diabetes in her maternal grandmother and mother; Heart Disease in her maternal grandfather; Obesity in her maternal grandfather, maternal grandmother, and mother; Other in her mother; Stroke in her maternal grandfather. REVIEW OF SYSTEMS:    Review of Systems   Constitutional:  Negative for chills, fever and unexpected weight change. HENT:  Negative for mouth sores. Respiratory:  Negative for cough, shortness of breath and wheezing. Cardiovascular:  Negative for chest pain and leg swelling. Gastrointestinal:  Negative for abdominal pain, blood in stool, diarrhea, nausea and vomiting. Genitourinary:  Negative for dysuria and hematuria. Skin:  Negative for rash.         PHYSICAL EXAM:    Vitals:    /74 (Site: Left Upper Arm, Position: Sitting, Cuff Size: Large Adult)   Pulse 73   Ht 5' 0.98\" (1.549 m)   Wt (!) 300 lb 12.8 oz (136.4 kg)   SpO2 95%   BMI 56.87 kg/m²     Physical Exam  Constitutional:       General: She is not in acute distress. Appearance: Normal appearance. HENT:      Mouth/Throat:      Mouth: Mucous membranes are moist.      Pharynx: Oropharynx is clear. Eyes:      Extraocular Movements: Extraocular movements intact. Conjunctiva/sclera: Conjunctivae normal.   Cardiovascular:      Rate and Rhythm: Normal rate and regular rhythm. Heart sounds: Normal heart sounds. No murmur heard. No friction rub. Pulmonary:      Effort: Pulmonary effort is normal. No respiratory distress. Breath sounds: Normal breath sounds. No wheezing or rhonchi. Musculoskeletal:         General: No swelling, tenderness or deformity. Normal range of motion. Cervical back: Normal range of motion and neck supple. Right lower leg: No edema. Left lower leg: No edema. Comments: Tenderness in right SI joint. Crepitus in both knees. No synovitis or tenderness in small joints of hands, wrists, elbows, shoulders. No swelling or tenderness in knees, ankles, MTPs. Good handgrip strength bilateral.  ROM shoulders is intact. Lymphadenopathy:      Cervical: No cervical adenopathy. Skin:     General: Skin is warm and dry. Findings: No lesion or rash. Neurological:      General: No focal deficit present. Mental Status: She is alert and oriented to person, place, and time. Mental status is at baseline. Cranial Nerves: No cranial nerve deficit. Gait: Gait normal.   Psychiatric:         Mood and Affect: Mood normal.          DATA:    Labs were reviewed.    Latest Reference Range & Units 12/6/21 10:40   Sodium 135 - 144 mmol/L 136   Potassium 3.7 - 5.3 mmol/L 4.8   Chloride 98 - 107 mmol/L 101   CO2 20 - 31 mmol/L 26   BUN,BUNPL 6 - 20 mg/dL 18   Creatinine 0.50 - 0.90 mg/dL 0.82   Bun/Cre Ratio 9 - 20  NOT REPORTED   Anion Gap 9 - 17 mmol/L 9   GFR Non-African American >60 mL/min >60   GFR African American >60 mL/min >60   GLUCOSE, FASTING,GF 70 - 99 mg/dL 91   CALCIUM, SERUM, 375808 8.6 - 10.4 mg/dL 9.2   ALBUMIN/GLOBULIN RATIO 1.0 - 2.5  1.6   Total Protein 6.4 - 8.3 g/dL 6.5   URIC ACID,URA 2.4 - 5.7 mg/dL 4.8      Latest Reference Range & Units 12/6/21 10:40   CRP 0.0 - 5.0 mg/L 37.0 (H)      Latest Reference Range & Units 12/6/21 10:40   Albumin 3.5 - 5.2 g/dL 4.0   ALBUMIN/GLOBULIN RATIO 1.0 - 2.5  1.6   Alk Phos 35 - 104 U/L 87   ALT 5 - 33 U/L 16   AST <32 U/L 12   Bilirubin 0.3 - 1.2 mg/dL 0.17 (L)   Total Protein 6.4 - 8.3 g/dL 6.5      Latest Reference Range & Units 12/6/21 10:40   WBC 3.5 - 11.3 k/uL 10.2   RBC 3.95 - 5.11 m/uL 4.71   Hemoglobin Quant 11.9 - 15.1 g/dL 14.0   Hematocrit 36.3 - 47.1 % 43.7   MCV 82.6 - 102.9 fL 92.8   MCH 25.2 - 33.5 pg 29.7   MCHC 28.4 - 34.8 g/dL 32.0   MPV 8.1 - 13.5 fL 10.2   RDW 11.8 - 14.4 % 14.3   Platelet Count 257 - 453 k/uL 315      Latest Reference Range & Units 12/6/21 10:40   Sed Rate 0 - 30 mm 28      Latest Reference Range & Units 12/6/21 10:40   BRO NEGATIVE  POSITIVE ! Anti ds DNA <10.0 IU/mL <0.5   Anti SARAH-1 <7.0 U/mL <0.4   Anti SSA <7.0 U/mL 56 (H)   Anti SSB <7.0 U/mL <0.3   Anti-Centromere <7.0 U/mL <0.4   Anti-Scleroderma <7.0 U/mL <0.6   Anti-Smith <7.0 U/mL 1.4      Latest Reference Range & Units 12/6/21 10:40   Anti-RNP70 <7.0 U/mL <0.3   Anti-U1RNP <5.0 U/mL 1.7      Latest Reference Range & Units 12/6/21 10:40   CCP IgG Antibodies 0.0 - 7.0 U/mL 4.4      Latest Reference Range & Units 12/6/21 10:40   Rheumatoid Factor <14 IU/mL <10           Imaging was reviewed. IMPRESSION/RECOMMENDATIONS:      1. Multiple joint pain, positive BRO. Pt has episodes of oral ulcers and photosensitivity. Otherwise, she is not exhibiting classic features of connective tissue diseases.  Suspect joint pain is related to degenerative arthritis. However, noted pt having prolonged morning stiffness and tenderness in right SI joint. -- assess AVISE CTD  -- labs today  -- Xray knees  -- Xray SI joint  -- RTC in 2-3 weeks        No orders of the defined types were placed in this encounter.        Leandrew Olszewski, MD    [address] [facility]  [address] [facility]. [address]  [phone]

## 2022-07-20 LAB — HLA-B27: POSITIVE

## 2022-08-04 ENCOUNTER — OFFICE VISIT (OUTPATIENT)
Dept: RHEUMATOLOGY | Age: 52
End: 2022-08-04
Payer: MEDICARE

## 2022-08-04 ENCOUNTER — TELEPHONE (OUTPATIENT)
Dept: RHEUMATOLOGY | Age: 52
End: 2022-08-04

## 2022-08-04 VITALS
SYSTOLIC BLOOD PRESSURE: 110 MMHG | HEIGHT: 61 IN | WEIGHT: 293 LBS | DIASTOLIC BLOOD PRESSURE: 68 MMHG | BODY MASS INDEX: 55.32 KG/M2 | HEART RATE: 71 BPM | OXYGEN SATURATION: 98 %

## 2022-08-04 DIAGNOSIS — M25.561 CHRONIC PAIN OF BOTH KNEES: ICD-10-CM

## 2022-08-04 DIAGNOSIS — Z15.89 HLA B27 (HLA B27 POSITIVE): Primary | ICD-10-CM

## 2022-08-04 DIAGNOSIS — M25.562 CHRONIC PAIN OF BOTH KNEES: ICD-10-CM

## 2022-08-04 DIAGNOSIS — M46.1 SACROILIITIS (HCC): Primary | ICD-10-CM

## 2022-08-04 DIAGNOSIS — R76.8 POSITIVE ANA (ANTINUCLEAR ANTIBODY): ICD-10-CM

## 2022-08-04 DIAGNOSIS — M46.1 SACROILIITIS (HCC): ICD-10-CM

## 2022-08-04 DIAGNOSIS — G89.29 CHRONIC PAIN OF BOTH KNEES: ICD-10-CM

## 2022-08-04 PROCEDURE — 3017F COLORECTAL CA SCREEN DOC REV: CPT | Performed by: INTERNAL MEDICINE

## 2022-08-04 PROCEDURE — 1036F TOBACCO NON-USER: CPT | Performed by: INTERNAL MEDICINE

## 2022-08-04 PROCEDURE — 99214 OFFICE O/P EST MOD 30 MIN: CPT | Performed by: INTERNAL MEDICINE

## 2022-08-04 PROCEDURE — G8427 DOCREV CUR MEDS BY ELIG CLIN: HCPCS | Performed by: INTERNAL MEDICINE

## 2022-08-04 PROCEDURE — G8417 CALC BMI ABV UP PARAM F/U: HCPCS | Performed by: INTERNAL MEDICINE

## 2022-08-04 ASSESSMENT — ENCOUNTER SYMPTOMS
VOMITING: 0
BLOOD IN STOOL: 0
DIARRHEA: 0
WHEEZING: 0
ABDOMINAL PAIN: 0
NAUSEA: 0
SHORTNESS OF BREATH: 0
COUGH: 0

## 2022-08-04 NOTE — TELEPHONE ENCOUNTER
----- Message from Royal Woo MD sent at 8/4/2022 10:28 AM EDT -----  Pt had xrays done at [facility]. Can we get results plesae.  These were ordered last visit

## 2022-08-04 NOTE — PROGRESS NOTES
[facility]) Physicians Rheumatology  Rheumatology Consult Note      8/4/2022       Reason for Consult:  positive BRO  Requesting Physician:  No ref. provider found     CHIEF COMPLAINT:    Chief Complaint   Patient presents with    Joint Pain     17 day         History Obtained From:  patient      HISTORY OF PRESENT ILLNESS:    45 y.o. female presents for follow up. In last office visit, labs were ordered for evaluation of positive BRO. No changes since seen last.  Pain is most pronounced in her lower back, ankles. Constnat achy nonradiaitng. Prolonged sitting, standing aggravates the pain. Improves partially with movement. Pain is mostly in her knees (right worse than right), ankles and rigth flank/back and lower back. Pain has been ongoing for years. Reports being diagnosed with osteoarthritis in her knees for 3-4 years. This pain is slightly improving over past few months. Walking aggravates the pain when doing it, but reports improvement in her pain 2 days after walking. Topical Voltaren gel eases the pain. Naproxen eases the pain. Swelling improved with Votlaren. Episodic sharp nonradiating. Rates the pain at 4/10 in pain scale. Has prolonged morning stiffness that lasts for hours in her knees and lower back lasting for 30 minutes. No skin rash. Has photosensitivity. No dry eyes or dry mouth. Episodes of oral ulcers. No h/o IBD-like symptoms  No h/o psoriasis or family h/o psoriasis    H/o iritis in 2017 treated with topical steroid eye drops. Continues to f/u with rheumatology. Past Medical History:     has a past medical history of Anemia, Arthritis, Chronic back pain, Diabetes mellitus ([address] GERD (gastroesophageal reflux disease), Hypertension, Hypothyroidism, Kidney stones, Thyroid disease, and Ulcer. Past Surgical History:     has a past surgical history that includes Tubal ligation;  Cholecystectomy; Colonoscopy; Endoscopy, colon, diagnostic; and Upper gastrointestinal endoscopy (N/A, 3/2/2021). Current Medications:      Current Outpatient Medications:     losartan (COZAAR) 100 MG tablet, Take 100 mg by mouth in the morning., Disp: , Rfl:     omeprazole (PRILOSEC) 20 MG delayed release capsule, Take 40 mg by mouth in the morning and at bedtime, Disp: , Rfl:     Cholecalciferol (VITAMIN D) 50 MCG (2000 UT) CAPS capsule, Take 2,000 Units by mouth daily, Disp: , Rfl:     traZODone (DESYREL) 50 MG tablet, Take 50 mg by mouth nightly, Disp: , Rfl:     sertraline (ZOLOFT) 25 MG tablet, Take 50 mg by mouth in the morning., Disp: , Rfl:     hydrochlorothiazide (HYDRODIURIL) 25 MG tablet, Take 12.5 mg by mouth daily , Disp: , Rfl:     metoprolol (TOPROL-XL) 25 MG XL tablet, Take 25 mg by mouth 2 times daily. , Disp: , Rfl:     levothyroxine (SYNTHROID) 100 MCG tablet, Take 175 mcg by mouth in the morning. ., Disp: , Rfl:     ferrous sulfate 325 (65 FE) MG tablet, Take 1 tablet by mouth 3 times daily (with meals) for 30 days. , Disp: 90 tablet, Rfl: 5    Allergies:    Patient has no known allergies. Social History:     reports that she quit smoking about 15 years ago. Her smoking use included cigarettes. She has never used smokeless tobacco. She reports current alcohol use. She reports that she does not use drugs. Family History:   family history includes Arthritis in her maternal grandfather, maternal grandmother, and mother; Cancer in her paternal grandmother; Diabetes in her maternal grandmother and mother; Heart Disease in her maternal grandfather; Obesity in her maternal grandfather, maternal grandmother, and mother; Other in her mother; Stroke in her maternal grandfather. REVIEW OF SYSTEMS:    Review of Systems   Constitutional:  Negative for chills, fever and unexpected weight change. HENT:  Negative for mouth sores. Respiratory:  Negative for cough, shortness of breath and wheezing. Cardiovascular:  Negative for chest pain and leg swelling.    Gastrointestinal: Negative for abdominal pain, blood in stool, diarrhea, nausea and vomiting. Genitourinary:  Negative for dysuria and hematuria. Skin:  Negative for rash. PHYSICAL EXAM:    Vitals:    /68 (Site: Left Upper Arm, Position: Sitting, Cuff Size: Medium Adult)   Pulse 71   Ht 5' 0.98\" (1.549 m)   Wt 298 lb 3.2 oz (135.3 kg)   SpO2 98%   BMI 56.37 kg/m²     Physical Exam  Constitutional:       General: She is not in acute distress. Appearance: Normal appearance. HENT:      Mouth/Throat:      Mouth: Mucous membranes are moist.      Pharynx: Oropharynx is clear. Eyes:      Extraocular Movements: Extraocular movements intact. Conjunctiva/sclera: Conjunctivae normal.   Cardiovascular:      Rate and Rhythm: Normal rate and regular rhythm. Heart sounds: Normal heart sounds. No murmur heard. No friction rub. Pulmonary:      Effort: Pulmonary effort is normal. No respiratory distress. Breath sounds: Normal breath sounds. No wheezing or rhonchi. Musculoskeletal:         General: No swelling, tenderness or deformity. Normal range of motion. Cervical back: Normal range of motion and neck supple. Right lower leg: No edema. Left lower leg: No edema. Comments: Tenderness in right SI joint. Crepitus in both knees. No synovitis or tenderness in small joints of hands, wrists, elbows, shoulders. No swelling or tenderness in knees, ankles, MTPs. Good handgrip strength bilateral.  ROM shoulders is intact. Lymphadenopathy:      Cervical: No cervical adenopathy. Skin:     General: Skin is warm and dry. Findings: No lesion or rash. Neurological:      General: No focal deficit present. Mental Status: She is alert and oriented to person, place, and time. Mental status is at baseline. Cranial Nerves: No cranial nerve deficit. Gait: Gait normal.   Psychiatric:         Mood and Affect: Mood normal.          DATA:    Labs were reviewed.   HLA-B27 positive  RF and anti-CCP negative  BRO and anti-SSA positive  SSB, RNP, dsDNA, anti-smith -- negative    CRP 2.01  ESR 44    CBC and CMP unremarkable        Imaging was reviewed. IMPRESSION/RECOMMENDATIONS:      1. Suspect spondyloarthorpathy: pt describes inflammatory back pain, has positive HLA-B27, and tenderness at SI joints and h/o iritis. 2. Positive BRO: reviewed with pt AVISE CTD panel. Reassured pt that SLE and other CTDs are less likely and the positive BRO is likely a false positive test result. Orders Placed This Encounter   Procedures    MRI SACRUM COCCYX W WO CONTRAST     Standing Status:   Future     Standing Expiration Date:   8/4/2023     Order Specific Question:   STAT Creatinine as needed:     Answer:   No     Order Specific Question:   Reason for exam:     Answer:   evaluate for sacroiliitis     Order Specific Question:   What is the sedation requirement?      Answer:   None          Vijaya Morales MD    [address] [facility]  [address] [facility]. [address] [facility]  [address] [place] [phone]

## 2022-08-04 NOTE — TELEPHONE ENCOUNTER
----- Message from Julieth Phillips MD sent at 8/4/2022  1:41 PM EDT -----  Please inform pt that I have reviewed Xray of SI joint from [facility]. It was normal with no features of degenerative arthritis. I'm ordering MRI of the sacrum to evaluate for inflammation as discussed in clinic today.

## 2022-08-04 NOTE — Clinical Note
Pt had xrays done at [facility]. Can we get results plesae.  These were ordered last visit

## 2022-09-12 RX ORDER — NAPROXEN 500 MG/1
500 TABLET ORAL 2 TIMES DAILY PRN
Qty: 60 TABLET | Refills: 3 | Status: SHIPPED | OUTPATIENT
Start: 2022-09-12 | End: 2022-10-12

## 2022-11-07 ENCOUNTER — OFFICE VISIT (OUTPATIENT)
Dept: RHEUMATOLOGY | Age: 52
End: 2022-11-07
Payer: MEDICARE

## 2022-11-07 VITALS
WEIGHT: 293 LBS | SYSTOLIC BLOOD PRESSURE: 120 MMHG | DIASTOLIC BLOOD PRESSURE: 72 MMHG | BODY MASS INDEX: 55.32 KG/M2 | HEIGHT: 61 IN | HEART RATE: 64 BPM | OXYGEN SATURATION: 94 %

## 2022-11-07 DIAGNOSIS — M46.1 SACROILIITIS (HCC): ICD-10-CM

## 2022-11-07 DIAGNOSIS — M17.11 PRIMARY OSTEOARTHRITIS OF RIGHT KNEE: ICD-10-CM

## 2022-11-07 DIAGNOSIS — M45.A0 NON-RADIOGRAPHIC AXIAL SPONDYLOARTHRITIS (HCC): Primary | ICD-10-CM

## 2022-11-07 PROCEDURE — 99214 OFFICE O/P EST MOD 30 MIN: CPT | Performed by: INTERNAL MEDICINE

## 2022-11-07 PROCEDURE — G8417 CALC BMI ABV UP PARAM F/U: HCPCS | Performed by: INTERNAL MEDICINE

## 2022-11-07 PROCEDURE — G8484 FLU IMMUNIZE NO ADMIN: HCPCS | Performed by: INTERNAL MEDICINE

## 2022-11-07 PROCEDURE — 1036F TOBACCO NON-USER: CPT | Performed by: INTERNAL MEDICINE

## 2022-11-07 PROCEDURE — 3017F COLORECTAL CA SCREEN DOC REV: CPT | Performed by: INTERNAL MEDICINE

## 2022-11-07 PROCEDURE — G8427 DOCREV CUR MEDS BY ELIG CLIN: HCPCS | Performed by: INTERNAL MEDICINE

## 2022-11-07 RX ORDER — NAPROXEN 500 MG/1
500 TABLET ORAL 2 TIMES DAILY PRN
Qty: 60 TABLET | Refills: 4 | Status: SHIPPED | OUTPATIENT
Start: 2022-11-07 | End: 2022-12-07

## 2022-11-07 ASSESSMENT — ENCOUNTER SYMPTOMS
BLOOD IN STOOL: 0
VOMITING: 0
COUGH: 0
NAUSEA: 0
ABDOMINAL PAIN: 0
SHORTNESS OF BREATH: 0
DIARRHEA: 0

## 2022-11-07 NOTE — PROGRESS NOTES
[facility]) Physicians Rheumatology  Rheumatology Consult Note      11/7/2022         CHIEF COMPLAINT:    Chief Complaint   Patient presents with    Follow-up     3 mo f/u         HISTORY OF PRESENT ILLNESS:    45 y.o. female presents for follow up. Suspected to have inflammatory arthritis as she describes inflammatory back pain, has positive HLA-B27, has tenderness at SI joints and h/o iritis. Naproxen 500 mg bid was prescribed last visit. Reports significant improvement in her joint pain since starting naproxen. She has been taking it once daily. The back pain improved. Has episodes of pain in the right knee and right ankle. No joint swelling. No prolonged morning stiffness. H/o iritis in 2017 treated with topical steroid eye drops. Past Medical History:     has a past medical history of Anemia, Arthritis, Chronic back pain, Diabetes mellitus ([address] GERD (gastroesophageal reflux disease), Hypertension, Hypothyroidism, Kidney stones, Thyroid disease, and Ulcer. Past Surgical History:     has a past surgical history that includes Tubal ligation; Cholecystectomy; Colonoscopy; Endoscopy, colon, diagnostic; and Upper gastrointestinal endoscopy (N/A, 3/2/2021).     Current Medications:      Current Outpatient Medications:     naproxen (NAPROSYN) 500 MG tablet, Take 1 tablet by mouth 2 times daily as needed for Pain Take with food, Disp: 60 tablet, Rfl: 4    losartan (COZAAR) 100 MG tablet, Take 100 mg by mouth in the morning., Disp: , Rfl:     omeprazole (PRILOSEC) 20 MG delayed release capsule, Take 40 mg by mouth in the morning and at bedtime, Disp: , Rfl:     Cholecalciferol (VITAMIN D) 50 MCG (2000 UT) CAPS capsule, Take 2,000 Units by mouth daily, Disp: , Rfl:     traZODone (DESYREL) 50 MG tablet, Take 50 mg by mouth nightly, Disp: , Rfl:     sertraline (ZOLOFT) 25 MG tablet, Take 50 mg by mouth in the morning., Disp: , Rfl:     hydrochlorothiazide (HYDRODIURIL) 25 MG tablet, Take 12.5 mg by mouth daily , Disp: , Rfl:     metoprolol (TOPROL-XL) 25 MG XL tablet, Take 25 mg by mouth 2 times daily. , Disp: , Rfl:     levothyroxine (SYNTHROID) 100 MCG tablet, Take 175 mcg by mouth in the morning. ., Disp: , Rfl:     ferrous sulfate 325 (65 FE) MG tablet, Take 1 tablet by mouth 3 times daily (with meals) for 30 days. , Disp: 90 tablet, Rfl: 5    Allergies:    Patient has no known allergies. Social History:     reports that she quit smoking about 15 years ago. Her smoking use included cigarettes. She has never used smokeless tobacco. She reports current alcohol use. She reports that she does not use drugs. Family History:   family history includes Arthritis in her maternal grandfather, maternal grandmother, and mother; Cancer in her paternal grandmother; Diabetes in her maternal grandmother and mother; Heart Disease in her maternal grandfather; Obesity in her maternal grandfather, maternal grandmother, and mother; Other in her mother; Stroke in her maternal grandfather. REVIEW OF SYSTEMS:    Review of Systems   Constitutional:  Negative for fever. Respiratory:  Negative for cough and shortness of breath. Cardiovascular:  Negative for chest pain. Gastrointestinal:  Negative for abdominal pain, blood in stool, diarrhea, nausea and vomiting. PHYSICAL EXAM:    Vitals:    /72 (Site: Left Upper Arm, Position: Sitting, Cuff Size: Large Adult)   Pulse 64   Ht 5' 0.98\" (1.549 m)   Wt 298 lb (135.2 kg)   SpO2 94%   BMI 56.34 kg/m²     Physical Exam  Constitutional:       General: She is not in acute distress. Appearance: Normal appearance. Eyes:      Extraocular Movements: Extraocular movements intact. Pulmonary:      Effort: Pulmonary effort is normal.   Musculoskeletal:         General: No swelling, tenderness or deformity. Normal range of motion. Cervical back: Normal range of motion. Comments: Tenderness in right SI joint. Crepitus in both knees.   No synovitis or tenderness in small joints of hands, wrists, elbows, shoulders. No swelling or tenderness in knees, ankles, MTPs. Good handgrip strength bilateral.  ROM shoulders is intact. Skin:     General: Skin is warm and dry. Findings: No rash. Neurological:      General: No focal deficit present. Mental Status: She is alert and oriented to person, place, and time. Gait: Gait normal.   Psychiatric:         Mood and Affect: Mood normal.          DATA:    Labs were reviewed. HLA-B27 positive  RF and anti-CCP negative  BRO and anti-SSA positive  SSB, RNP, dsDNA, anti-smith -- negative    CRP 2.01  ESR 44    CBC and CMP unremarkable        Imaging was reviewed. IMPRESSION/RECOMMENDATIONS:      1. Suspect spondyloarthorpathy: pt describes inflammatory back pain, has positive HLA-B27, and tenderness at SI joints and h/o iritis. -- clinically improved after starting naproxen 500 mg once daily. -- continue naproxen 500 mg bid prn    2. Osteoarthritis right knee: had Xray showing mild OA with osteophytes. -- if worsen, would consider IA steroid injection    RTC in 6 months or sooner if needed      No orders of the defined types were placed in this encounter.          Mukesh Lugo MD    [address]   [address] [facility]. [address] [facility]  [address]  [phone]

## 2023-02-27 ENCOUNTER — HOSPITAL ENCOUNTER (OUTPATIENT)
Age: 53
Setting detail: SPECIMEN
Discharge: HOME | End: 2023-02-27

## 2023-02-27 LAB
ABSOLUTE EOS #: 0.3 K/UL (ref 0–0.44)
ABSOLUTE IMMATURE GRANULOCYTE: 0.05 K/UL (ref 0–0.3)
ABSOLUTE LYMPH #: 2.06 K/UL (ref 1.1–3.7)
ABSOLUTE MONO #: 0.58 K/UL (ref 0.1–1.2)
ALBUMIN SERPL-MCNC: 4 G/DL (ref 3.5–5.2)
ALBUMIN/GLOBULIN RATIO: 1.3 (ref 1–2.5)
ALP SERPL-CCNC: 72 U/L (ref 35–104)
ALT SERPL-CCNC: 24 U/L (ref 5–33)
ANION GAP SERPL CALCULATED.3IONS-SCNC: 11 MMOL/L (ref 9–17)
AST SERPL-CCNC: 18 U/L
BASOPHILS # BLD: 1 % (ref 0–2)
BASOPHILS ABSOLUTE: 0.1 K/UL (ref 0–0.2)
BILIRUB SERPL-MCNC: 0.2 MG/DL (ref 0.3–1.2)
BUN SERPL-MCNC: 14 MG/DL (ref 6–20)
CALCIUM SERPL-MCNC: 9.5 MG/DL (ref 8.6–10.4)
CHLORIDE SERPL-SCNC: 103 MMOL/L (ref 98–107)
CHOLEST SERPL-MCNC: 193 MG/DL
CHOLESTEROL/HDL RATIO: 4.7
CO2 SERPL-SCNC: 27 MMOL/L (ref 20–31)
CREAT SERPL-MCNC: 0.56 MG/DL (ref 0.5–0.9)
EOSINOPHILS RELATIVE PERCENT: 4 % (ref 1–4)
GFR SERPL CREATININE-BSD FRML MDRD: >60 ML/MIN/1.73M2
GLUCOSE SERPL-MCNC: 84 MG/DL (ref 70–99)
HCT VFR BLD AUTO: 47.9 % (ref 36.3–47.1)
HDLC SERPL-MCNC: 41 MG/DL
HGB BLD-MCNC: 14.9 G/DL (ref 11.9–15.1)
IMMATURE GRANULOCYTES: 1 %
LDLC SERPL CALC-MCNC: 133 MG/DL (ref 0–130)
LYMPHOCYTES # BLD: 28 % (ref 24–43)
MCH RBC QN AUTO: 29.1 PG (ref 25.2–33.5)
MCHC RBC AUTO-ENTMCNC: 31.1 G/DL (ref 28.4–34.8)
MCV RBC AUTO: 93.6 FL (ref 82.6–102.9)
MONOCYTES # BLD: 8 % (ref 3–12)
NRBC AUTOMATED: 0 PER 100 WBC
PDW BLD-RTO: 13.6 % (ref 11.8–14.4)
PLATELET # BLD AUTO: 295 K/UL (ref 138–453)
PMV BLD AUTO: 10.2 FL (ref 8.1–13.5)
POTASSIUM SERPL-SCNC: 4.4 MMOL/L (ref 3.7–5.3)
PROT SERPL-MCNC: 7.1 G/DL (ref 6.4–8.3)
RBC # BLD: 5.12 M/UL (ref 3.95–5.11)
SEG NEUTROPHILS: 58 % (ref 36–65)
SEGMENTED NEUTROPHILS ABSOLUTE COUNT: 4.16 K/UL (ref 1.5–8.1)
SODIUM SERPL-SCNC: 141 MMOL/L (ref 135–144)
TRIGL SERPL-MCNC: 96 MG/DL
TSH SERPL-ACNC: 3.94 UIU/ML (ref 0.3–5)
WBC # BLD AUTO: 7.3 K/UL (ref 3.5–11.3)

## 2023-02-28 LAB
EST. AVERAGE GLUCOSE BLD GHB EST-MCNC: 117 MG/DL
HBA1C MFR BLD: 5.7 % (ref 4–6)

## 2023-05-01 NOTE — PROGRESS NOTES
Madie Barrow RN/CM  Pt here for EGD. Scope  used. Pictures taken. Cold forceps biopsy taken in 1 jar. Procedure completed and tolerated well.

## 2023-05-08 ENCOUNTER — OFFICE VISIT (OUTPATIENT)
Dept: RHEUMATOLOGY | Age: 53
End: 2023-05-08
Payer: MEDICAID

## 2023-05-08 VITALS
OXYGEN SATURATION: 98 % | SYSTOLIC BLOOD PRESSURE: 118 MMHG | HEART RATE: 95 BPM | DIASTOLIC BLOOD PRESSURE: 74 MMHG | BODY MASS INDEX: 57.52 KG/M2 | WEIGHT: 293 LBS | HEIGHT: 60 IN

## 2023-05-08 DIAGNOSIS — M46.1 SACROILIITIS (HCC): ICD-10-CM

## 2023-05-08 DIAGNOSIS — Z51.81 ENCOUNTER FOR MONITORING CHRONIC NSAID THERAPY: Primary | ICD-10-CM

## 2023-05-08 DIAGNOSIS — Z79.1 ENCOUNTER FOR MONITORING CHRONIC NSAID THERAPY: Primary | ICD-10-CM

## 2023-05-08 PROCEDURE — 99214 OFFICE O/P EST MOD 30 MIN: CPT | Performed by: INTERNAL MEDICINE

## 2023-05-08 RX ORDER — NAPROXEN 500 MG/1
500 TABLET ORAL 2 TIMES DAILY PRN
Qty: 60 TABLET | Refills: 4 | Status: SHIPPED | OUTPATIENT
Start: 2023-05-08 | End: 2023-06-07

## 2023-05-08 RX ORDER — ACETAMINOPHEN 500 MG/1
TABLET ORAL
COMMUNITY
Start: 2023-04-25

## 2023-05-08 ASSESSMENT — ENCOUNTER SYMPTOMS
VOMITING: 0
COUGH: 0
NAUSEA: 0
ABDOMINAL PAIN: 0
DIARRHEA: 0
SHORTNESS OF BREATH: 0

## 2023-05-08 NOTE — PROGRESS NOTES
[facility]) Physicians Rheumatology  Rheumatology Consult Note      5/8/2023         CHIEF COMPLAINT:    Chief Complaint   Patient presents with    6 Month Follow-Up     Non-radiographic axial spondyloarthritis ([facility] [address]    Other     Pt is doing well         HISTORY OF PRESENT ILLNESS:    47 y.o. 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 Presently, she is on naproxen 500 mg bid. Reports that since she was diagnosed with fatty liver, she has been taking naproxen on as needed basis. On average, takes naproxen 500 mg once every 1-3 days. No significant joint pain. No joint swelling. No prolonged morning stiffness. H/o iritis in 2017 treated with topical steroid eye drops. Past Medical History:     has a past medical history of Anemia, Arthritis, Chronic back pain, Diabetes mellitus ([facility] [address] Fatty liver, GERD (gastroesophageal reflux disease), Hypertension, Hypothyroidism, Kidney stones, Thyroid disease, and Ulcer. Past Surgical History:     has a past surgical history that includes Tubal ligation; Cholecystectomy; Colonoscopy; Endoscopy, colon, diagnostic; and Upper gastrointestinal endoscopy (N/A, 3/2/2021).     Current Medications:      Current Outpatient Medications:     naproxen (NAPROSYN) 500 MG tablet, Take 1 tablet by mouth 2 times daily as needed for Pain Take with food, Disp: 60 tablet, Rfl: 4    V-R NON-ASPIRIN 500 MG tablet, TAKE 1 TO 2 TABLETS BY MOUTH EVERY 8 HOURS AS NEEDED FOR PAIN: DO NOT EXCEED 6 TABS IN 24 HOURS, Disp: , Rfl:     losartan (COZAAR) 100 MG tablet, Take 1 tablet by mouth daily, Disp: , Rfl:     omeprazole (PRILOSEC) 20 MG delayed release capsule, Take 2 capsules by mouth in the morning and at bedtime, Disp: , Rfl:     Cholecalciferol (VITAMIN D) 50 MCG (2000 UT) CAPS capsule, Take 2,000 Units by mouth daily, Disp: , Rfl:     traZODone (DESYREL) 50 MG tablet, Take 1

## 2023-08-23 NOTE — POST SEDATION
[facility]  Sedation/Analgesia Post Sedation Record    Patient: Arslan Reyes : 1970  Med Rec#: [id number] Acc#: [id number]   Procedure Performed By: [place] First  Primary Care Physician: LESLY Nava    POST-PROCEDURE    Physicians/Assistants: [place] First  Procedure Performed:    Sedation/Anesthesia:     Estimated Blood Loss:          ml  Specimens Removed:  []None [x]Other:      Disposition of Specimen:  [x]Pathology []Other      Complications:   [x]None Immediate []Other:     Post-procedure Diagnosis/Findings:             Recommendations:           luz Persaud MD [facility]  Electronically signed 3/2/2021 at 3:38 PM Spoke to patient schedule our labs for Friday 08/25/2023. Explained that the labs she had today was not for us.

## 2023-12-18 ENCOUNTER — HOSPITAL ENCOUNTER (EMERGENCY)
Age: 53
Discharge: HOME | End: 2023-12-18
Payer: MEDICAID

## 2023-12-18 VITALS
RESPIRATION RATE: 18 BRPM | BODY MASS INDEX: 50.98 KG/M2 | DIASTOLIC BLOOD PRESSURE: 84 MMHG | HEIGHT: 61 IN | TEMPERATURE: 98.1 F | SYSTOLIC BLOOD PRESSURE: 120 MMHG | HEART RATE: 83 BPM | WEIGHT: 270 LBS | OXYGEN SATURATION: 95 %

## 2023-12-18 DIAGNOSIS — J06.9 VIRAL URI WITH COUGH: Primary | ICD-10-CM

## 2023-12-18 LAB — S PYO AG THROAT QL: NEGATIVE

## 2023-12-18 PROCEDURE — 87651 STREP A DNA AMP PROBE: CPT

## 2023-12-18 PROCEDURE — 99213 OFFICE O/P EST LOW 20 MIN: CPT

## 2023-12-18 PROCEDURE — 99202 OFFICE O/P NEW SF 15 MIN: CPT | Performed by: NURSE PRACTITIONER

## 2023-12-18 ASSESSMENT — PAIN DESCRIPTION - FREQUENCY: FREQUENCY: CONTINUOUS

## 2023-12-18 ASSESSMENT — PAIN - FUNCTIONAL ASSESSMENT
PAIN_FUNCTIONAL_ASSESSMENT: 0-10
PAIN_FUNCTIONAL_ASSESSMENT: ACTIVITIES ARE NOT PREVENTED

## 2023-12-18 ASSESSMENT — PAIN SCALES - GENERAL: PAINLEVEL_OUTOF10: 7

## 2023-12-18 ASSESSMENT — PAIN DESCRIPTION - PAIN TYPE: TYPE: ACUTE PAIN

## 2023-12-18 ASSESSMENT — PAIN DESCRIPTION - LOCATION: LOCATION: THROAT

## 2023-12-18 ASSESSMENT — PAIN DESCRIPTION - DESCRIPTORS: DESCRIPTORS: DISCOMFORT

## 2023-12-18 ASSESSMENT — PAIN DESCRIPTION - ONSET: ONSET: ON-GOING

## 2023-12-18 NOTE — ED NOTES
Discharge instructions  reviewed with pt. Pt verbalized understanding. Pt ambulated out in stable condition. Assessment unchanged upon discharge.      Bella Mas RN  12/18/23 1947

## 2023-12-18 NOTE — ED PROVIDER NOTES
[address]  Urgent Care Encounter       CHIEF COMPLAINT       Chief Complaint   Patient presents with    Pharyngitis    Sore Throat     Requested to be strep tested       Nurses Notes reviewed and I agree except as noted in the HPI. HISTORY OF PRESENT ILLNESS   Audrey Gary is a 46 y.o. female who presents to the to the [facility] urgent care for evaluation of pharyngitis. She reports her symptoms started roughly 2 to 3 days ago. She reports congestion, rhinorrhea, postnasal drainage, and pharyngitis. Does report mild cough. Does report she took a COVID test at home which was negative. She does report that she is going on vacation in a week and is concerned for other serious illnesses. The history is provided by the patient. No  was used. REVIEW OF SYSTEMS     Review of Systems   Constitutional:  Negative for activity change, appetite change, chills, fatigue and fever. HENT:  Positive for congestion, rhinorrhea and sore throat. Negative for ear discharge and ear pain. Respiratory:  Positive for cough. Negative for chest tightness and shortness of breath. Cardiovascular:  Negative for chest pain. Gastrointestinal:  Negative for diarrhea, nausea and vomiting. Genitourinary:  Negative for dysuria. Skin:  Negative for rash. Allergic/Immunologic: Negative for environmental allergies and food allergies. Neurological:  Negative for dizziness and headaches. PAST MEDICAL HISTORY         Diagnosis Date    Anemia     Arthritis     Chronic back pain     Diabetes mellitus ([address])     Fatty liver 04/2023    GERD (gastroesophageal reflux disease)     Hypertension     Hypothyroidism     Kidney stones     Thyroid disease     Ulcer        SURGICALHISTORY     Patient  has a past surgical history that includes Tubal ligation; Cholecystectomy; Colonoscopy; Endoscopy, colon, diagnostic; and Upper gastrointestinal endoscopy (N/A, 3/2/2021).     CURRENT

## 2023-12-18 NOTE — ED TRIAGE NOTES
Pt to urgent care due to sinus congestion and a sore throat. Pt took a negative COVID test at home and it was negative. She leaves on Monday for [place] and is concerned she will not be able to go.

## 2024-03-25 ENCOUNTER — OFFICE VISIT (OUTPATIENT)
Dept: RHEUMATOLOGY | Age: 54
End: 2024-03-25
Payer: MEDICAID

## 2024-03-25 VITALS
DIASTOLIC BLOOD PRESSURE: 76 MMHG | HEIGHT: 61 IN | OXYGEN SATURATION: 95 % | BODY MASS INDEX: 49.88 KG/M2 | WEIGHT: 264.2 LBS | HEART RATE: 73 BPM | SYSTOLIC BLOOD PRESSURE: 118 MMHG

## 2024-03-25 DIAGNOSIS — Z51.81 ENCOUNTER FOR MONITORING CHRONIC NSAID THERAPY: ICD-10-CM

## 2024-03-25 DIAGNOSIS — Z79.1 ENCOUNTER FOR MONITORING CHRONIC NSAID THERAPY: ICD-10-CM

## 2024-03-25 DIAGNOSIS — M45.A0 NON-RADIOGRAPHIC AXIAL SPONDYLOARTHRITIS (HCC): Primary | ICD-10-CM

## 2024-03-25 DIAGNOSIS — M46.1 SACROILIITIS (HCC): ICD-10-CM

## 2024-03-25 PROCEDURE — 99213 OFFICE O/P EST LOW 20 MIN: CPT | Performed by: INTERNAL MEDICINE

## 2024-03-25 RX ORDER — NAPROXEN 500 MG/1
500 TABLET ORAL 2 TIMES DAILY PRN
Qty: 60 TABLET | Refills: 4 | Status: SHIPPED | OUTPATIENT
Start: 2024-03-25

## 2024-03-25 ASSESSMENT — ENCOUNTER SYMPTOMS
NAUSEA: 0
VOMITING: 0
COUGH: 0
SHORTNESS OF BREATH: 0
DIARRHEA: 0
ABDOMINAL PAIN: 0

## 2024-03-25 NOTE — PROGRESS NOTES
[facility] Physicians Rheumatology  Rheumatology Consult Note      3/25/2024         CHIEF COMPLAINT:    Chief Complaint   Patient presents with    Follow-up     Pt last seen 5/8/23    Other     Pt has no concerns at this time         HISTORY OF PRESENT ILLNESS:    Last seen in May 2023.  53 y.o. female with non-radiographic axial spondyloarthritis (inflammatory back pain, sacroiliitis, +HLA-B27, h/o iritis) presents for follow up. Presently, she is on naproxen 500 mg bid.    No significant joint pain. No joint swelling. No prolonged morning stiffness.  Takes naproxen 500 mg once daily as needed. On average, takes 1-2 tab every 2 weeks.    H/o iritis in 2017 treated with topical steroid eye drops.         Past Medical History:     has a past medical history of Anemia, Arthritis, Chronic back pain, Diabetes mellitus (HCC), Fatty liver, GERD (gastroesophageal reflux disease), Hypertension, Hypothyroidism, Kidney stones, Thyroid disease, and Ulcer.    Past Surgical History:     has a past surgical history that includes Tubal ligation; Cholecystectomy; Colonoscopy; Endoscopy, colon, diagnostic; and Upper gastrointestinal endoscopy (N/A, 3/2/2021).    Current Medications:      Current Outpatient Medications:     losartan (COZAAR) 100 MG tablet, Take 0.5 tablets by mouth daily, Disp: , Rfl:     omeprazole (PRILOSEC) 20 MG delayed release capsule, Take 2 capsules by mouth in the morning and at bedtime, Disp: , Rfl:     Cholecalciferol (VITAMIN D) 50 MCG (2000 UT) CAPS capsule, Take 2,000 Units by mouth daily, Disp: , Rfl:     traZODone (DESYREL) 50 MG tablet, Take 1 tablet by mouth nightly, Disp: , Rfl:     sertraline (ZOLOFT) 25 MG tablet, Take 2 tablets by mouth daily, Disp: , Rfl:     hydrochlorothiazide (HYDRODIURIL) 25 MG tablet, Take 0.5 tablets by mouth daily, Disp: , Rfl:     metoprolol (TOPROL-XL) 25 MG XL tablet, Take 1 tablet by mouth in the morning and 1 tablet in the evening.  ., Disp: , Rfl:

## 2024-12-12 DIAGNOSIS — Z51.81 ENCOUNTER FOR MONITORING CHRONIC NSAID THERAPY: ICD-10-CM

## 2024-12-12 DIAGNOSIS — M45.A0 NON-RADIOGRAPHIC AXIAL SPONDYLOARTHRITIS (HCC): ICD-10-CM

## 2024-12-12 DIAGNOSIS — M46.1 SACROILIITIS (HCC): ICD-10-CM

## 2024-12-12 DIAGNOSIS — Z79.1 ENCOUNTER FOR MONITORING CHRONIC NSAID THERAPY: ICD-10-CM

## 2024-12-17 RX ORDER — NAPROXEN 500 MG/1
TABLET ORAL
Qty: 60 TABLET | Refills: 0 | Status: SHIPPED | OUTPATIENT
Start: 2024-12-17

## 2025-01-05 DIAGNOSIS — M45.A0 NON-RADIOGRAPHIC AXIAL SPONDYLOARTHRITIS (HCC): ICD-10-CM

## 2025-01-05 DIAGNOSIS — M46.1 SACROILIITIS (HCC): ICD-10-CM

## 2025-01-05 DIAGNOSIS — Z51.81 ENCOUNTER FOR MONITORING CHRONIC NSAID THERAPY: ICD-10-CM

## 2025-01-05 DIAGNOSIS — Z79.1 ENCOUNTER FOR MONITORING CHRONIC NSAID THERAPY: ICD-10-CM

## 2025-01-06 RX ORDER — NAPROXEN 500 MG/1
TABLET ORAL
Qty: 60 TABLET | Refills: 1 | Status: SHIPPED | OUTPATIENT
Start: 2025-01-06

## 2025-01-19 ENCOUNTER — PATIENT MESSAGE (OUTPATIENT)
Age: 55
End: 2025-01-19

## 2025-01-20 RX ORDER — PREDNISONE 10 MG/1
TABLET ORAL
Qty: 18 TABLET | Refills: 0 | Status: SHIPPED | OUTPATIENT
Start: 2025-01-20 | End: 2025-01-29

## 2025-03-20 DIAGNOSIS — Z79.1 ENCOUNTER FOR MONITORING CHRONIC NSAID THERAPY: ICD-10-CM

## 2025-03-20 DIAGNOSIS — M45.A0 NON-RADIOGRAPHIC AXIAL SPONDYLOARTHRITIS (HCC): ICD-10-CM

## 2025-03-20 DIAGNOSIS — Z51.81 ENCOUNTER FOR MONITORING CHRONIC NSAID THERAPY: ICD-10-CM

## 2025-03-20 DIAGNOSIS — M46.1 SACROILIITIS: ICD-10-CM

## 2025-03-20 RX ORDER — NAPROXEN 500 MG/1
TABLET ORAL
Qty: 60 TABLET | Refills: 0 | Status: SHIPPED | OUTPATIENT
Start: 2025-03-20

## 2025-03-31 ENCOUNTER — OFFICE VISIT (OUTPATIENT)
Age: 55
End: 2025-03-31
Payer: MEDICAID

## 2025-03-31 VITALS
WEIGHT: 264.11 LBS | HEIGHT: 61 IN | OXYGEN SATURATION: 98 % | HEART RATE: 72 BPM | DIASTOLIC BLOOD PRESSURE: 72 MMHG | SYSTOLIC BLOOD PRESSURE: 122 MMHG | BODY MASS INDEX: 49.86 KG/M2

## 2025-03-31 DIAGNOSIS — G89.29 CHRONIC PAIN OF BOTH KNEES: ICD-10-CM

## 2025-03-31 DIAGNOSIS — Z51.81 ENCOUNTER FOR MONITORING CHRONIC NSAID THERAPY: ICD-10-CM

## 2025-03-31 DIAGNOSIS — M25.551 BILATERAL HIP PAIN: Primary | ICD-10-CM

## 2025-03-31 DIAGNOSIS — M45.A0 NON-RADIOGRAPHIC AXIAL SPONDYLOARTHRITIS (HCC): ICD-10-CM

## 2025-03-31 DIAGNOSIS — M25.562 CHRONIC PAIN OF BOTH KNEES: ICD-10-CM

## 2025-03-31 DIAGNOSIS — M25.561 CHRONIC PAIN OF BOTH KNEES: ICD-10-CM

## 2025-03-31 DIAGNOSIS — Z79.1 ENCOUNTER FOR MONITORING CHRONIC NSAID THERAPY: ICD-10-CM

## 2025-03-31 DIAGNOSIS — M46.1 SACROILIITIS: ICD-10-CM

## 2025-03-31 DIAGNOSIS — M25.552 BILATERAL HIP PAIN: Primary | ICD-10-CM

## 2025-03-31 PROCEDURE — 99214 OFFICE O/P EST MOD 30 MIN: CPT | Performed by: INTERNAL MEDICINE

## 2025-03-31 PROCEDURE — 99213 OFFICE O/P EST LOW 20 MIN: CPT | Performed by: INTERNAL MEDICINE

## 2025-03-31 RX ORDER — NAPROXEN 500 MG/1
500 TABLET ORAL 2 TIMES DAILY WITH MEALS
Qty: 60 TABLET | Refills: 5 | Status: SHIPPED | OUTPATIENT
Start: 2025-03-31

## 2025-03-31 ASSESSMENT — ENCOUNTER SYMPTOMS
SHORTNESS OF BREATH: 0
COUGH: 0
VOMITING: 0
NAUSEA: 0
ABDOMINAL PAIN: 0

## 2025-04-08 LAB
BUN BLDV-MCNC: 16 MG/DL
CALCIUM SERPL-MCNC: 10.2 MG/DL
CHLORIDE BLD-SCNC: 100 MMOL/L
CO2: 32 MMOL/L
CREAT SERPL-MCNC: 0.74 MG/DL
EGFR: 60
GLUCOSE BLD-MCNC: 75 MG/DL
POTASSIUM SERPL-SCNC: 4.7 MMOL/L
SODIUM BLD-SCNC: 137 MMOL/L

## 2025-04-10 ENCOUNTER — RESULTS FOLLOW-UP (OUTPATIENT)
Age: 55
End: 2025-04-10

## 2025-04-10 DIAGNOSIS — M25.561 CHRONIC PAIN OF BOTH KNEES: ICD-10-CM

## 2025-04-10 DIAGNOSIS — M25.551 BILATERAL HIP PAIN: ICD-10-CM

## 2025-04-10 DIAGNOSIS — M25.562 CHRONIC PAIN OF BOTH KNEES: ICD-10-CM

## 2025-04-10 DIAGNOSIS — M25.552 BILATERAL HIP PAIN: ICD-10-CM

## 2025-04-10 DIAGNOSIS — G89.29 CHRONIC PAIN OF BOTH KNEES: ICD-10-CM

## 2025-04-10 NOTE — TELEPHONE ENCOUNTER
Please can we request XR images from [facility].    Please inform pt that XR of hips, according to radiologist, is showing mild degenerative changes in the hip joints bilateral, and moderate degenerative changes in lumbar spine.

## 2025-05-18 ENCOUNTER — PATIENT MESSAGE (OUTPATIENT)
Age: 55
End: 2025-05-18

## 2025-05-19 RX ORDER — MELOXICAM 15 MG/1
15 TABLET ORAL DAILY PRN
Qty: 30 TABLET | Refills: 2 | Status: SHIPPED | OUTPATIENT
Start: 2025-05-19

## 2025-07-17 ENCOUNTER — PATIENT MESSAGE (OUTPATIENT)
Age: 55
End: 2025-07-17

## 2025-08-11 RX ORDER — MELOXICAM 15 MG/1
15 TABLET ORAL DAILY PRN
Qty: 30 TABLET | Refills: 5 | Status: SHIPPED | OUTPATIENT
Start: 2025-08-11

## 2025-08-11 RX ORDER — MELOXICAM 15 MG/1
TABLET ORAL
Qty: 30 TABLET | Refills: 0 | OUTPATIENT
Start: 2025-08-11

## (undated) DEVICE — GLOVE ORTHO 8   MSG9480